# Patient Record
Sex: FEMALE | Race: WHITE | NOT HISPANIC OR LATINO | Employment: FULL TIME | URBAN - METROPOLITAN AREA
[De-identification: names, ages, dates, MRNs, and addresses within clinical notes are randomized per-mention and may not be internally consistent; named-entity substitution may affect disease eponyms.]

---

## 2020-12-14 ENCOUNTER — TRANSFERRED RECORDS (OUTPATIENT)
Dept: HEALTH INFORMATION MANAGEMENT | Facility: CLINIC | Age: 30
End: 2020-12-14

## 2020-12-16 ENCOUNTER — TRANSFERRED RECORDS (OUTPATIENT)
Dept: HEALTH INFORMATION MANAGEMENT | Facility: CLINIC | Age: 30
End: 2020-12-16

## 2020-12-18 ENCOUNTER — TRANSFERRED RECORDS (OUTPATIENT)
Dept: HEALTH INFORMATION MANAGEMENT | Facility: CLINIC | Age: 30
End: 2020-12-18

## 2020-12-22 ENCOUNTER — TRANSFERRED RECORDS (OUTPATIENT)
Dept: HEALTH INFORMATION MANAGEMENT | Facility: CLINIC | Age: 30
End: 2020-12-22

## 2021-01-08 ENCOUNTER — TRANSFERRED RECORDS (OUTPATIENT)
Dept: HEALTH INFORMATION MANAGEMENT | Facility: CLINIC | Age: 31
End: 2021-01-08

## 2021-01-21 ENCOUNTER — TRANSFERRED RECORDS (OUTPATIENT)
Dept: HEALTH INFORMATION MANAGEMENT | Facility: CLINIC | Age: 31
End: 2021-01-21

## 2021-01-21 ENCOUNTER — TELEPHONE (OUTPATIENT)
Dept: OPHTHALMOLOGY | Facility: CLINIC | Age: 31
End: 2021-01-21

## 2021-01-21 NOTE — TELEPHONE ENCOUNTER
Left message with direct number on home number (only number in system) at [phone]    Mick Griffith RN 4:47 PM 01/22/21    ---      [phone]     Left message with Direct number at [phone]    Tentatively scheduled February 11th at 1230 at B location    Mick Griffith RN 8:53 AM 01/22/21         Health Call Center    Phone Message    May a detailed message be left on voicemail: yes     Reason for Call: Appointment Intake    Referring Provider Name: PCP  Dr Mars Blackmon  Diagnosis and/or Symptoms: Pseudo Tumor     Action Taken: Message routed to:  [facility]    Travel Screening: Not Applicable

## 2021-01-25 ENCOUNTER — TELEPHONE (OUTPATIENT)
Dept: OPHTHALMOLOGY | Facility: CLINIC | Age: 31
End: 2021-01-25

## 2021-01-25 NOTE — TELEPHONE ENCOUNTER
I spoke to the patient and verified her upcoming appointment with Dr. Vega.   I reviewed appointment time and location

## 2021-02-11 ENCOUNTER — OFFICE VISIT (OUTPATIENT)
Dept: OPHTHALMOLOGY | Facility: CLINIC | Age: 31
End: 2021-02-11
Attending: OPHTHALMOLOGY
Payer: COMMERCIAL

## 2021-02-11 DIAGNOSIS — H47.10 PAPILLEDEMA: Primary | ICD-10-CM

## 2021-02-11 DIAGNOSIS — H53.40 VISUAL FIELD DEFECT: Primary | ICD-10-CM

## 2021-02-11 DIAGNOSIS — H53.40 VISUAL FIELD DEFECT: ICD-10-CM

## 2021-02-11 PROCEDURE — 92083 EXTENDED VISUAL FIELD XM: CPT | Performed by: OPHTHALMOLOGY

## 2021-02-11 PROCEDURE — G0463 HOSPITAL OUTPT CLINIC VISIT: HCPCS | Mod: 25

## 2021-02-11 PROCEDURE — 99244 OFF/OP CNSLTJ NEW/EST MOD 40: CPT | Mod: GC | Performed by: OPHTHALMOLOGY

## 2021-02-11 PROCEDURE — 92133 CPTRZD OPH DX IMG PST SGM ON: CPT | Performed by: OPHTHALMOLOGY

## 2021-02-11 RX ORDER — ACETAZOLAMIDE 250 MG/1
1000 TABLET ORAL 2 TIMES DAILY
Qty: 240 TABLET | Refills: 11 | Status: SHIPPED | OUTPATIENT
Start: 2021-02-11 | End: 2022-02-17

## 2021-02-11 RX ORDER — ACETAZOLAMIDE 250 MG/1
1000 TABLET ORAL 2 TIMES DAILY
Qty: 240 TABLET | Refills: 11
Start: 2021-02-11 | End: 2021-02-11

## 2021-02-11 RX ORDER — BUPROPION HYDROCHLORIDE 150 MG/1
TABLET ORAL
COMMUNITY
Start: 2020-07-20

## 2021-02-11 RX ORDER — FLUOXETINE 40 MG/1
CAPSULE ORAL
COMMUNITY
Start: 2020-04-13

## 2021-02-11 ASSESSMENT — TONOMETRY
OS_IOP_MMHG: 18
OD_IOP_MMHG: 16
IOP_METHOD: ICARE

## 2021-02-11 ASSESSMENT — EXTERNAL EXAM - LEFT EYE: OS_EXAM: NORMAL

## 2021-02-11 ASSESSMENT — VISUAL ACUITY
OD_CC+: -2/+2
METHOD: SNELLEN - LINEAR
CORRECTION_TYPE: CONTACTS
OS_CC: 20/20
OD_CC: 20/25

## 2021-02-11 ASSESSMENT — SLIT LAMP EXAM - LIDS: COMMENTS: NORMAL

## 2021-02-11 ASSESSMENT — CONF VISUAL FIELD
METHOD: COUNTING FINGERS
OS_NORMAL: 1
OD_NORMAL: 1

## 2021-02-11 ASSESSMENT — EXTERNAL EXAM - RIGHT EYE: OD_EXAM: NORMAL

## 2021-02-11 NOTE — LETTER
2021         RE:  :  MRN: Radha Biswas  1990  [id number]     Dear Dr. Blackmon,    Thank you for asking me to see your very pleasant patient, Radha Biswas, in neuro-ophthalmic consultation.  I would like to thank you for sending your records and I have summarized them in the history of present illness. My assessment and plan are below.  For further details, please see my attached clinic note.      Assessment & Plan     Radha Biswas is a 30 year old female with the following diagnoses:   1. Visual field defect         Patient was sent for consultation by Dr. Mars Blackmon for bilateral disc edema    HPI: Radha Biswas is a 30 year old female who presents for evaluation of bilateral disc edema. The patient first presented to [facility] for routine eye exam to update contact lenses around . She was evaluated by Dr. Forrest (OD) who incidentally noted bilateral disc edema. OCT rNFL showed diffuse thickening of both ONHs, (314 OD, 212 OS); baseline 24-2 VF showed nonspecific deficits in the right eye and inferior arcuate in the left eye (both reliable). She was evaluted by Dr. Blackmon on 20 referred her for MRI/MRV and started her on Diamox 250 mg PO QID and then increased to 500 mg TID. MRI was read by radiologist as showing possible optic nerve sheath enhancement in both eyes but exam was limited by motion artifact; no secondary imaging evidence for intracranial hypertension. MRV was without evidence of dural venous thrombosis (no comment on stenosis). She then underwent LP on 20 which revealed opening pressure 25 cm H2O while lying prone. She started Diamox after obtaining the lumbar puncture. She had positional headache after lumbar puncture and required blood patch x2. Aside from this, her typical headache improved after the lumbar puncture.     The patient reports years of headaches in her temples, forehead, and radiating into her neck. In the last 2 years,  "this headache has progressively worsened. She feels like her headache is almost always there but will fluctuate throughout the day. Ibuprofen will sometime improve her symptoms. No nausea, vomiting, photophobia. Last spring, she noticed that she would have episodes of transient visual obscuration after standing from sitting that would last 10 seconds. The last episode of TVOs was late December 2020. She denies any pulsatile tinnitus. She last took Diamox 1 week ago (ran out of prescription).     She had repeat MRI obtained on 1/8/21 which was read as normal. At her last appt with Dr. Blackmon on 1/21/21, repeat VF 24-2 showed possible enlargement of the blind spot. She was then referred to [facility] for neuro-ophthalmology evaluation. She is 5 ft 8 in.  She is about 89.         POH: Myopia  PMH: shingles x4 (twice in recent years) along ribs; hypertension in pregnancy; grand mal seizure at age 2yo.   SHx: Never smoker. No alcohol use. No illicit drug use.   FHx: negative for autoimmune disease    Laboratory:   BIJAL, anti-CCP, and Borrelia burgdorferi AB negative.   CSF: \"normal fluid\" per notes      Visual acuity is 20/25 right eye and 20/20 left eye. Intraocular pressure was 16/18. There was no afferent pupillary defect. Color plates 11/11 in both eyes. Optical coherence tomography of the retinal nerve fiber layer shows diffuse thickening in both eyes. Octopus visual field shows generalized depression in the right eye and nonspecific deficits in left eye. Slit lamp exam is normal. Dilated fundus exam shows grade 3 optic disc edema in both eyes.     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  She continues to have pretty significant edema despite being on Diamox 1500 mg daily.  I will have her increase her Diamox to 2000 mg daily.   Goal of weight loss of 12 pounds was discussed.  We discussed risk of pregnancy while on Diamox since she is interested in becoming pregnant " it within the next year.  Follow up in 6 weeks or sooner as needed for worsening symptoms.        Again, thank you for allowing me to participate in the care of your patient.      Sincerely,    Jorge Vega MD  Professor  Ophthalmology Residency   Director of Neuro-Ophthalmology  [employer] [facility] [employer] Endowed Chair  Departments of Ophthalmology, Neurology, and Neurosurgery  [facility] [address]  [address] [place] [address]  [id number]  T - [phone]  F - [phone]  [email or web address] [email or web address]      CC: [facility]  [address] [facility] [address]  [facility] [address]  Via Fax: [phone]    DX = Idiopathic Intracranial Hypertension (IIH)

## 2021-02-11 NOTE — NURSING NOTE
Chief Complaint(s) and History of Present Illness(es)     Papilledema Evaluation     Laterality: both eyes    Onset: unknown    Course: stable    Associated symptoms: Negative for eye pain, double vision, abnormal color vision and headache              Comments     Eval for Papilledema. Was taking Diamox TID, unsure of the dose (since 12/25/20), ran out last weekend. Dr. Blackmon increased the dose at one point.   Reports mild HAs now. No changes in vision noted. Dr. Blackmon noted abnormality on exam for contact refill. Isa has never had any symptoms.   MRI: 2x, initial image showed something on the optic nerve, 2nd image was normal. +LP    Inf: pt

## 2021-02-11 NOTE — Clinical Note
2/11/2021       RE: Radha Biswas  [address] [facility] [address]     Dear Colleague,    Thank you for referring your patient, Radha Biswas, to the [facility] at [facility]. Please see a copy of my visit note below.         Assessment & Plan     Radha Biswas is a 30 year old female with the following diagnoses:   1. Visual field defect         Patient was sent for consultation by Dr. Mars Blackmon for bilateral disc edema    HPI: Radha Biswas is a 30 year old female who presents for evaluation of bilateral disc edema. The patient first presented to [facility] for routine eye exam to update contact lenses around 12/14. She was evaluated by Dr. Forrest (OD) who incidentally noted bilateral disc edema. OCT rNFL showed diffuse thickening of both ONHs, (314 OD, 212 OS); baseline 24-2 VF showed nonspecific deficits in the right eye and inferior arcuate in the left eye (both reliable). She was evaluted by Dr. Blackmon on 12/17/20 referred her for MRI/MRV and started her on Diamox 250 mg PO QID and then increased to 500 mg TID. MRI was read by radiologist as showing possible optic nerve sheath enhancement in both eyes but exam was limited by motion artifact; no secondary imaging evidence for intracranial hypertension. MRV was without evidence of dural venous thrombosis (no comment on stenosis). She then underwent LP on 12/22/20 which revealed opening pressure 25 cm H2O while lying prone. She started Diamox after obtaining the lumbar puncture. She had positional headache after lumbar puncture and required blood patch x2. Aside from this, her typical headache improved after the lumbar puncture.     The patient reports years of headaches in her temples, forehead, and radiating into her neck. In the last 2 years, this headache has progressively worsened. She feels like her headache is almost always there but will fluctuate throughout  "the day. Ibuprofen will sometime improve her symptoms. No nausea, vomiting, photophobia. Last spring, she noticed that she would have episodes of transient visual obscuration after standing from sitting that would last 10 seconds. The last episode of TVOs was late December 2020. She denies any pulsatile tinnitus. She last took Diamox 1 week ago (ran out of prescription).     She had repeat MRI obtained on 1/8/21 which was read as normal. At her last appt with Dr. Blackmon on 1/21/21, repeat VF 24-2 showed possible enlargement of the blind spot. She was then referred to [facility] for neuro-ophthalmology evaluation. She is 5 ft 8 in.  She is about 89.         POH: Myopia  PMH: shingles x4 (twice in recent years) along ribs; hypertension in pregnancy; grand mal seizure at age 1yo.   SHx: Never smoker. No alcohol use. No illicit drug use.   FHx: negative for autoimmune disease    Laboratory:   BIJAL, anti-CCP, and Borrelia burgdorferi AB negative.   CSF: \"normal fluid\" per notes      Visual acuity is 20/25 right eye and 20/20 left eye. Intraocular pressure was 16/18. There was no afferent pupillary defect. Color plates 11/11 in both eyes. Optical coherence tomography of the retinal nerve fiber layer shows diffuse thickening in both eyes. Octopus visual field shows generalized depression in the right eye and nonspecific deficits in left eye. Slit lamp exam is normal. Dilated fundus exam shows grade 3 optic disc edema in both eyes.     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  She continues to have pretty significant edema despite being on Diamox 1500 mg daily.  I will have her increase her Diamox to 2000 mg daily.   Goal of weight loss of 12 pounds was discussed.  We discussed risk of pregnancy while on Diamox since she is interested in becoming pregnant it within the next year.  Follow up in 6 weeks or sooner as needed for worsening symptoms.              Attending " Physician Attestation:  Complete documentation of historical and exam elements from today's encounter can be found in the full encounter summary report (not reduplicated in this progress note).  I personally obtained the chief complaint(s) and history of present illness.  I confirmed and edited as necessary the review of systems, past medical/surgical history, family history, social history, and examination findings as documented by others; and I examined the patient myself.  I personally reviewed the relevant tests, images, and reports as documented above.  I formulated and edited as necessary the assessment and plan and discussed the findings and management plan with the patient and family. I personally reviewed the ophthalmic test(s) associated with this encounter, agree with the interpretation(s) as documented by the resident/fellow, and have edited the corresponding report(s) as necessary.  - Jorge Cope MD  Ophthalmology PGY-3  [facility]         Again, thank you for allowing me to participate in the care of your patient.      Sincerely,    Jorge Vega MD

## 2021-02-11 NOTE — PROGRESS NOTES
Assessment & Plan     Radha Biswas is a 30 year old female with the following diagnoses:   1. Visual field defect         Patient was sent for consultation by Dr. Mars Blackmon for bilateral disc edema    HPI: Radha Biswas is a 30 year old female who presents for evaluation of bilateral disc edema. The patient first presented to [facility] for routine eye exam to update contact lenses around 12/14. She was evaluated by Dr. Forrest (OD) who incidentally noted bilateral disc edema. OCT rNFL showed diffuse thickening of both ONHs, (314 OD, 212 OS); baseline 24-2 VF showed nonspecific deficits in the right eye and inferior arcuate in the left eye (both reliable). She was evaluted by Dr. Blackmon on 12/17/20 referred her for MRI/MRV and started her on Diamox 250 mg PO QID and then increased to 500 mg TID. MRI was read by radiologist as showing possible optic nerve sheath enhancement in both eyes but exam was limited by motion artifact; no secondary imaging evidence for intracranial hypertension. MRV was without evidence of dural venous thrombosis (no comment on stenosis). She then underwent LP on 12/22/20 which revealed opening pressure 25 cm H2O while lying prone. She started Diamox after obtaining the lumbar puncture. She had positional headache after lumbar puncture and required blood patch x2. Aside from this, her typical headache improved after the lumbar puncture.     The patient reports years of headaches in her temples, forehead, and radiating into her neck. In the last 2 years, this headache has progressively worsened. She feels like her headache is almost always there but will fluctuate throughout the day. Ibuprofen will sometime improve her symptoms. No nausea, vomiting, photophobia. Last spring, she noticed that she would have episodes of transient visual obscuration after standing from sitting that would last 10 seconds. The last episode of TVOs was late December 2020. She  "denies any pulsatile tinnitus. She last took Diamox 1 week ago (ran out of prescription).     She had repeat MRI obtained on 1/8/21 which was read as normal. At her last appt with Dr. Blackmon on 1/21/21, repeat VF 24-2 showed possible enlargement of the blind spot. She was then referred to [facility] for neuro-ophthalmology evaluation. She is 5 ft 8 in.  She is about 89.         POH: Myopia  PMH: shingles x4 (twice in recent years) along ribs; hypertension in pregnancy; grand mal seizure at age 1yo.   SHx: Never smoker. No alcohol use. No illicit drug use.   FHx: negative for autoimmune disease    Laboratory:   BIJAL, anti-CCP, and Borrelia burgdorferi AB negative.   CSF: \"normal fluid\" per notes      Visual acuity is 20/25 right eye and 20/20 left eye. Intraocular pressure was 16/18. There was no afferent pupillary defect. Color plates 11/11 in both eyes. Optical coherence tomography of the retinal nerve fiber layer shows diffuse thickening in both eyes. Octopus visual field shows generalized depression in the right eye and nonspecific deficits in left eye. Slit lamp exam is normal. Dilated fundus exam shows grade 3 optic disc edema in both eyes.     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  She continues to have pretty significant edema despite being on Diamox 1500 mg daily.  I will have her increase her Diamox to 2000 mg daily.   Goal of weight loss of 12 pounds was discussed.  We discussed risk of pregnancy while on Diamox since she is interested in becoming pregnant it within the next year.  Follow up in 6 weeks or sooner as needed for worsening symptoms.              Attending Physician Attestation:  Complete documentation of historical and exam elements from today's encounter can be found in the full encounter summary report (not reduplicated in this progress note).  I personally obtained the chief complaint(s) and history of present illness.  I confirmed and " edited as necessary the review of systems, past medical/surgical history, family history, social history, and examination findings as documented by others; and I examined the patient myself.  I personally reviewed the relevant tests, images, and reports as documented above.  I formulated and edited as necessary the assessment and plan and discussed the findings and management plan with the patient and family. I personally reviewed the ophthalmic test(s) associated with this encounter, agree with the interpretation(s) as documented by the resident/fellow, and have edited the corresponding report(s) as necessary.  - Jorge Cope MD  Ophthalmology PGY-3  [facility]

## 2021-03-25 ENCOUNTER — OFFICE VISIT (OUTPATIENT)
Dept: OPHTHALMOLOGY | Facility: CLINIC | Age: 31
End: 2021-03-25
Attending: OPHTHALMOLOGY
Payer: COMMERCIAL

## 2021-03-25 DIAGNOSIS — H53.40 VISUAL FIELD DEFECT: Primary | ICD-10-CM

## 2021-03-25 DIAGNOSIS — H47.10 PAPILLEDEMA: ICD-10-CM

## 2021-03-25 PROCEDURE — 92133 CPTRZD OPH DX IMG PST SGM ON: CPT | Performed by: OPHTHALMOLOGY

## 2021-03-25 PROCEDURE — 92083 EXTENDED VISUAL FIELD XM: CPT | Performed by: OPHTHALMOLOGY

## 2021-03-25 PROCEDURE — 99213 OFFICE O/P EST LOW 20 MIN: CPT | Mod: GC | Performed by: OPHTHALMOLOGY

## 2021-03-25 PROCEDURE — G0463 HOSPITAL OUTPT CLINIC VISIT: HCPCS | Mod: 25

## 2021-03-25 ASSESSMENT — EXTERNAL EXAM - RIGHT EYE: OD_EXAM: NORMAL

## 2021-03-25 ASSESSMENT — VISUAL ACUITY
OD_CC: 20/20
OS_CC: 20/20
METHOD: SNELLEN - LINEAR
OS_CC+: -1
OD_CC+: -2

## 2021-03-25 ASSESSMENT — REFRACTION_WEARINGRX
OD_CYLINDER: +0.75
OS_SPHERE: -5.50
OD_AXIS: 110
OD_SPHERE: -7.25
OS_AXIS: 075
OS_CYLINDER: +1.50

## 2021-03-25 ASSESSMENT — CONF VISUAL FIELD
OD_NORMAL: 1
METHOD: COUNTING FINGERS
OS_NORMAL: 1

## 2021-03-25 ASSESSMENT — SLIT LAMP EXAM - LIDS: COMMENTS: NORMAL

## 2021-03-25 ASSESSMENT — TONOMETRY
IOP_METHOD: ICARE S/S
OD_IOP_MMHG: 13
OS_IOP_MMHG: 12

## 2021-03-25 ASSESSMENT — EXTERNAL EXAM - LEFT EYE: OS_EXAM: NORMAL

## 2021-03-25 NOTE — PROGRESS NOTES
"       Assessment & Plan     Radha Biswas is a 30 year old female with the following diagnoses:   1. Visual field defect         30 year old woman presented for follow up on papilledema from [facility]. She was last seen in  2/11/2021 back then he has significant edema in both eyes in 1500mg of diamox so we increased the dose to 2000mg. She lost 7 lbs since we last saw her. She is having tingling in her extremities but it is well tolerated.    POH: Myopia  PMH: shingles x4 (twice in recent years) along ribs; hypertension in pregnancy; grand mal seizure at age 1yo.   SHx: Never smoker. No alcohol use. No illicit drug use.   FHx: negative for autoimmune disease  Meds: fluoxitine, nortriptyline, alprazolam, bupropion     Laboratory:   BIJAL, anti-CCP, and Borrelia burgdorferi AB negative.   CSF: \"normal fluid\" per notes    Visual acuity is 20/20 in both eyes. Intraocular pressure was 16/18. There was no afferent pupillary defect. Color plates 11/11 in both eyes. Optical coherence tomography of the retinal nerve fiber layer shows diffuse thickening in both eyes that is better compared to last visit. Octopus visual field shows generalized depression in the right eye and nonspecific deficits in left eye. Slit lamp exam is normal. Dilated fundus exam shows grade 3 optic disc edema in both eyes.     My impression is that her papilledema is better today. Keep same dose of diamox and follow up in 2-3 months.             Attending Physician Attestation:  Complete documentation of historical and exam elements from today's encounter can be found in the full encounter summary report (not reduplicated in this progress note).  I personally obtained the chief complaint(s) and history of present illness.  I confirmed and edited as necessary the review of systems, past medical/surgical history, family history, social history, and examination findings as documented by others; and I examined the patient myself.  I personally reviewed the " relevant tests, images, and reports as documented above.  I formulated and edited as necessary the assessment and plan and discussed the findings and management plan with the patient and family. I personally reviewed the ophthalmic test(s) associated with this encounter, agree with the interpretation(s) as documented by the resident/fellow, and have edited the corresponding report(s) as necessary.  - Jorge Hackett MD  Neuro-ophthalmology fellow  [facility]

## 2021-07-15 ENCOUNTER — OFFICE VISIT (OUTPATIENT)
Dept: OPHTHALMOLOGY | Facility: CLINIC | Age: 31
End: 2021-07-15
Attending: OPHTHALMOLOGY
Payer: COMMERCIAL

## 2021-07-15 DIAGNOSIS — G93.2 IDIOPATHIC INTRACRANIAL HYPERTENSION: Primary | ICD-10-CM

## 2021-07-15 DIAGNOSIS — H47.10 PAPILLEDEMA: ICD-10-CM

## 2021-07-15 DIAGNOSIS — H47.10 PAPILLEDEMA: Primary | ICD-10-CM

## 2021-07-15 PROCEDURE — 92014 COMPRE OPH EXAM EST PT 1/>: CPT | Mod: GC | Performed by: OPHTHALMOLOGY

## 2021-07-15 PROCEDURE — G0463 HOSPITAL OUTPT CLINIC VISIT: HCPCS

## 2021-07-15 PROCEDURE — 92133 CPTRZD OPH DX IMG PST SGM ON: CPT | Performed by: OPHTHALMOLOGY

## 2021-07-15 ASSESSMENT — REFRACTION_WEARINGRX
OD_CYLINDER: +0.75
OD_AXIS: 110
OS_CYLINDER: +1.50
OS_SPHERE: -5.50
OS_AXIS: 075
OD_SPHERE: -7.25

## 2021-07-15 ASSESSMENT — EXTERNAL EXAM - RIGHT EYE: OD_EXAM: NORMAL

## 2021-07-15 ASSESSMENT — TONOMETRY
IOP_METHOD: ICARE
OD_IOP_MMHG: 14
OS_IOP_MMHG: 13

## 2021-07-15 ASSESSMENT — CONF VISUAL FIELD
OS_NORMAL: 1
OD_NORMAL: 1

## 2021-07-15 ASSESSMENT — VISUAL ACUITY
OS_CC: 20/20
METHOD: SNELLEN - LINEAR
OD_CC: 20/20

## 2021-07-15 ASSESSMENT — SLIT LAMP EXAM - LIDS: COMMENTS: NORMAL

## 2021-07-15 ASSESSMENT — EXTERNAL EXAM - LEFT EYE: OS_EXAM: NORMAL

## 2021-07-15 NOTE — PROGRESS NOTES
"       Assessment & Plan     Radha Biswas is a 30 year old female with the following diagnoses:   1. Papilledema         30 year old woman presented for follow up on papilledema from [facility]. She was last seen in 3/25/21 at which time degree of papilledema appeared improved on exam and OCT, and VF was stable . She was thus maintained on the same Diamox dose of 1000 mg BID.     Today, she reports ***    POH: Myopia  PMH: shingles x4 (twice in recent years) along ribs; hypertension in pregnancy; grand mal seizure at age 1yo.   SHx: Never smoker. No alcohol use. No illicit drug use.   FHx: negative for autoimmune disease  Meds: fluoxitine, nortriptyline, alprazolam, bupropion     Laboratory:   BIJAL, anti-CCP, and Borrelia burgdorferi AB negative.   CSF: \"normal fluid\" per notes    Exam:             Attending Physician Attestation:  Complete documentation of historical and exam elements from today's encounter can be found in the full encounter summary report (not reduplicated in this progress note).  I personally obtained the chief complaint(s) and history of present illness.  I confirmed and edited as necessary the review of systems, past medical/surgical history, family history, social history, and examination findings as documented by others; and I examined the patient myself.  I personally reviewed the relevant tests, images, and reports as documented above.  I formulated and edited as necessary the assessment and plan and discussed the findings and management plan with the patient and family. I personally reviewed the ophthalmic test(s) associated with this encounter, agree with the interpretation(s) as documented by the resident/fellow, and have edited the corresponding report(s) as necessary.  - Jorge Hackett MD  Neuro-ophthalmology fellow  [facility]    "

## 2021-07-15 NOTE — NURSING NOTE
Chief Complaints and History of Present Illnesses   Patient presents with     Blurred Vision Follow-Up     Chief Complaint(s) and History of Present Illness(es)     Blurred Vision Follow-Up               Comments     Radha Biswas is a 30 year old female with the following diagnoses:   1. Visual field defect   Patient feels improvement in headaches, even when she is non-compliant with medication.   Patient taking 4- 250 mg diamox twice daily. Tolerating well.   No vision changes.   Marlaor [employer] 10:00 AM July 15, 2021

## 2021-07-15 NOTE — PROGRESS NOTES
"       Assessment & Plan     Radha Biswas is a 30 year old female with the following diagnoses:   1. Idiopathic intracranial hypertension    2. Papilledema         Patient was last seen on 3/25/21 for follow up of idiopathic intracranial hypertension (IIH).  Last visit her papilledema was improved.  I had her continue on Diamox 2000 mg daily.      Since her last visit, she notes improvement in headaches, which are largely resolved at this point. She describes mild, intermittent ringing in her ears, denies \"whooshing\" or pulsatile tinnitus. She denies blurred vision, double vision, eye pain. She has been taking diamox as prescribed, notes improved associated tingling and rare nausea. She has lost 15 lbs since initial visit.      POH: Myopia  PMH: shingles x4 (twice in recent years) along ribs; hypertension in pregnancy; grand mal seizure at age 2yo.   SHx: Never smoker. No alcohol use. No illicit drug use.   FHx: negative for autoimmune disease  Meds: fluoxitine, nortriptyline, alprazolam, bupropion     Laboratory:   BIJAL, anti-CCP, and Borrelia burgdorferi AB negative.   CSF: \"normal fluid\" per notes    Visual acuity is 20/20 in both eyes. Intraocular pressure was 14/13. There was no afferent pupillary defect. Color plates 11/11 in both eyes. Optical coherence tomography of the retinal nerve fiber layer shows diffuse thickening in both eyes that is dramatically improved compared to last visit. Slit lamp exam is normal. Dilated fundus exam shows grade 2 optic disc edema in both eyes.     My impression is that her papilledema is better today. Patient states that she is tolerating diamox without problems, is interested in resolution of optic disc edema and discontinuation of diamox in a timely fashion, especially as she is considering having another child. Will have her continue current dose, continue lifestyle management for weight loss, and follow up in 3 months.        Attending Physician Attestation:  Complete " documentation of historical and exam elements from today's encounter can be found in the full encounter summary report (not reduplicated in this progress note).  I personally obtained the chief complaint(s) and history of present illness.  I confirmed and edited as necessary the review of systems, past medical/surgical history, family history, social history, and examination findings as documented by others; and I examined the patient myself.  I personally reviewed the relevant tests, images, and reports as documented above.  I formulated and edited as necessary the assessment and plan and discussed the findings and management plan with the patient and family. I personally reviewed the ophthalmic test(s) associated with this encounter, agree with the interpretation(s) as documented by the resident/fellow, and have edited the corresponding report(s) as necessary.  - Jorge Matos MD  Ophthalmology Resident, PGY-3

## 2021-10-19 ENCOUNTER — OFFICE VISIT (OUTPATIENT)
Dept: OPHTHALMOLOGY | Facility: CLINIC | Age: 31
End: 2021-10-19
Attending: OPHTHALMOLOGY
Payer: COMMERCIAL

## 2021-10-19 DIAGNOSIS — H47.10 OPTIC DISC EDEMA: ICD-10-CM

## 2021-10-19 DIAGNOSIS — G93.2 IDIOPATHIC INTRACRANIAL HYPERTENSION: Primary | ICD-10-CM

## 2021-10-19 PROCEDURE — 92014 COMPRE OPH EXAM EST PT 1/>: CPT | Mod: GC | Performed by: OPHTHALMOLOGY

## 2021-10-19 PROCEDURE — 92133 CPTRZD OPH DX IMG PST SGM ON: CPT | Performed by: OPHTHALMOLOGY

## 2021-10-19 PROCEDURE — G0463 HOSPITAL OUTPT CLINIC VISIT: HCPCS

## 2021-10-19 ASSESSMENT — VISUAL ACUITY
OD_CC: 20/25
OS_CC: 20/25
CORRECTION_TYPE: CONTACTS
OD_CC+: +2
METHOD: SNELLEN - LINEAR

## 2021-10-19 ASSESSMENT — CONF VISUAL FIELD
OS_NORMAL: 1
OD_NORMAL: 1

## 2021-10-19 ASSESSMENT — TONOMETRY
OD_IOP_MMHG: 14
OS_IOP_MMHG: 16
IOP_METHOD: ICARE

## 2021-10-19 ASSESSMENT — SLIT LAMP EXAM - LIDS: COMMENTS: NORMAL

## 2021-10-19 ASSESSMENT — EXTERNAL EXAM - RIGHT EYE: OD_EXAM: NORMAL

## 2021-10-19 ASSESSMENT — EXTERNAL EXAM - LEFT EYE: OS_EXAM: NORMAL

## 2021-10-19 NOTE — PROGRESS NOTES
"       Assessment & Plan     Radha Biswas is a 30 year old female with the following diagnoses:   1. Visual field defect         Patient was last seen on 7/15/21 for follow up of idiopathic intracranial hypertension (IIH).  Last visit her papilledema was improved.  I had her continue   on Diamox 1000 mg BID.     She reports headaches for the last few weeks. Does not wake up with them and they do not worsen with laying flat. These have been managed with ibuprofen.     Denies double vision, pulsatile tinnitus, TVOs. ROS negative for use of oral antibiotics for acne of the tetracycline class, retinoid acne creams, oral vitamin A supplements. She thinks her weight is the same or maybe a few lbs lighter. She was -15 lbs at last visit.     POH: Myopia  PMH: shingles x4 (twice in recent years) along ribs; hypertension in pregnancy; grand mal seizure at age 1yo.   SHx: Never smoker. No alcohol use. No illicit drug use.   FHx: negative for autoimmune disease  Meds: fluoxitine, nortriptyline, alprazolam, bupropion     Laboratory:   BIJAL, anti-CCP, and Borrelia burgdorferi AB negative.   CSF: \"normal fluid\" per notes    Visual acuity with correction was 20/25+2 in the right eye and 20/25 in the left eye. IOP was 14 in the right eye and 16 in the left eye. Visual fields were full in both eyes. Ocular motility was full in all gaze directions. Color plates were 11/11 in the right eye and 11/11 in the left eye.  Pupils were equal, round and reactive to light with no RAPD.     Strabismus exam: Full, ortho     Anterior segment exam: normal  Dilated fundus exam: grade one papilledema right eye, trace left eye     Tests ordered and interpreted today:  OCT rNFL demonstrated a mean NFL thickness of 110 (from 126) in the right eye and 115 (from 129) in the left eye. Thickness profile demonstrated borderline thickening in both eyes  VF: No VF performed today.     My impression is that her papilledema is better today though is still " present. We will decrease her to 750 mg BID. Follow-up in 3 months.         Attending Physician Attestation:  Complete documentation of historical and exam elements from today's encounter can be found in the full encounter summary report (not reduplicated in this progress note).  I personally obtained the chief complaint(s) and history of present illness.  I confirmed and edited as necessary the review of systems, past medical/surgical history, family history, social history, and examination findings as documented by others; and I examined the patient myself.  I personally reviewed the relevant tests, images, and reports as documented above.  I formulated and edited as necessary the assessment and plan and discussed the findings and management plan with the patient and family. I personally reviewed the ophthalmic test(s) associated with this encounter, agree with the interpretation(s) as documented by the resident/fellow, and have edited the corresponding report(s) as necessary.  - Jorge Vinson, DO   PGY-5 Neuro-Ophthalmology Fellow

## 2021-10-19 NOTE — NURSING NOTE
Chief Complaints and History of Present Illnesses   Patient presents with     Blurred Vision Follow-Up     Chief Complaint(s) and History of Present Illness(es)     Blurred Vision Follow-Up               Comments     Radha Biswas is a 31 year old female with the following diagnoses:   1. Idiopathic intracranial hypertension   2. Papilledema     Unsure if there has been a significant change in vision.   Increase in headaches. Started again in the last couple of weeks.   No pulsatile tinnitus.   Currently taking 4-250 mg diamox bid. Tolerating well.     Johana HERNANDES 10:30 AM October 19, 2021

## 2022-02-18 DIAGNOSIS — H53.40 VISUAL FIELD DEFECT: ICD-10-CM

## 2022-02-18 DIAGNOSIS — H47.10 PAPILLEDEMA: ICD-10-CM

## 2022-02-18 RX ORDER — ACETAZOLAMIDE 250 MG/1
TABLET ORAL
Qty: 240 TABLET | Refills: 11 | OUTPATIENT
Start: 2022-02-18

## 2022-02-18 NOTE — TELEPHONE ENCOUNTER
acetaZOLAMIDE (DIAMOX) 250 MG tablet 240 tablet 3 2/17/2022     Duplicate, sent 2/17/22 to [id number]

## 2022-02-19 DIAGNOSIS — H53.40 VISUAL FIELD DEFECT: ICD-10-CM

## 2022-02-19 DIAGNOSIS — H47.10 PAPILLEDEMA: ICD-10-CM

## 2022-02-19 RX ORDER — ACETAZOLAMIDE 250 MG/1
TABLET ORAL
Qty: 240 TABLET | Refills: 11 | OUTPATIENT
Start: 2022-02-19

## 2022-02-20 DIAGNOSIS — H47.10 PAPILLEDEMA: ICD-10-CM

## 2022-02-20 DIAGNOSIS — H53.40 VISUAL FIELD DEFECT: ICD-10-CM

## 2022-02-20 RX ORDER — ACETAZOLAMIDE 250 MG/1
TABLET ORAL
Qty: 240 TABLET | Refills: 11 | OUTPATIENT
Start: 2022-02-20

## 2022-03-01 ENCOUNTER — OFFICE VISIT (OUTPATIENT)
Dept: OPHTHALMOLOGY | Facility: CLINIC | Age: 32
End: 2022-03-01
Attending: OPHTHALMOLOGY
Payer: COMMERCIAL

## 2022-03-01 DIAGNOSIS — H53.40 VISUAL FIELD DEFECT: ICD-10-CM

## 2022-03-01 DIAGNOSIS — G93.2 IDIOPATHIC INTRACRANIAL HYPERTENSION: Primary | ICD-10-CM

## 2022-03-01 DIAGNOSIS — H47.10 PAPILLEDEMA: Primary | ICD-10-CM

## 2022-03-01 DIAGNOSIS — G93.2 IDIOPATHIC INTRACRANIAL HYPERTENSION: ICD-10-CM

## 2022-03-01 PROCEDURE — 92133 CPTRZD OPH DX IMG PST SGM ON: CPT | Performed by: OPHTHALMOLOGY

## 2022-03-01 PROCEDURE — 92014 COMPRE OPH EXAM EST PT 1/>: CPT | Mod: GC | Performed by: OPHTHALMOLOGY

## 2022-03-01 PROCEDURE — G0463 HOSPITAL OUTPT CLINIC VISIT: HCPCS | Performed by: TECHNICIAN/TECHNOLOGIST

## 2022-03-01 ASSESSMENT — SLIT LAMP EXAM - LIDS: COMMENTS: NORMAL

## 2022-03-01 ASSESSMENT — EXTERNAL EXAM - LEFT EYE: OS_EXAM: NORMAL

## 2022-03-01 ASSESSMENT — CONF VISUAL FIELD
OS_NORMAL: 1
METHOD: COUNTING FINGERS
OD_NORMAL: 1

## 2022-03-01 ASSESSMENT — TONOMETRY
IOP_METHOD: ICARE
OS_IOP_MMHG: 11
OD_IOP_MMHG: 12

## 2022-03-01 ASSESSMENT — VISUAL ACUITY
OS_CC: 20/20
OS_CC+: -1
OD_CC+: -2+1
CORRECTION_TYPE: GLASSES
METHOD: SNELLEN - LINEAR
OD_CC: 20/25

## 2022-03-01 ASSESSMENT — CUP TO DISC RATIO
OD_RATIO: 0
OS_RATIO: 0

## 2022-03-01 ASSESSMENT — EXTERNAL EXAM - RIGHT EYE: OD_EXAM: NORMAL

## 2022-03-01 NOTE — PROGRESS NOTES
Assessment & Plan     Radha Biswas is a 31 year old female with the following diagnoses:   1. Papilledema    2. Idiopathic intracranial hypertension    3. Visual field defect       Patient was last seen on 10/19/21 for follow up of idiopathic intracranial hypertension (IIH).  Last visit her optic disc edema was still present but was improved.  I asked her to decrease Diamox to 750 mg twice a day.      Patient is doing well on current dose.  Has some dry mouth from this medication otherwise no other adverse reactions. Vision is stable in both eyes, no headaches, no pulsatile tinnitus, no diplopia.  Reports no weight change since last visit.  Missed last appointment secondary to Covid infection.    On exam visual acuity is stable in both eyes with full color vision.  Appearance of the optic nerves today appear stable with grade 1 on the right and grade 1-2 papilledema on the left, however OCT RNFL today shows an improvement of edema of the right eye from G110 to 97 and on the left eye as well from G115 to 103.    Considering noted improvement, will decrease Diamox to 500 twice daily x 1 month, then 250 daily x 1 month, then stop.  Follow up 3-4 months off medication.  Patient to call if has new symptoms during taper.             Attending Physician Attestation:  Complete documentation of historical and exam elements from today's encounter can be found in the full encounter summary report (not reduplicated in this progress note).  I personally obtained the chief complaint(s) and history of present illness.  I confirmed and edited as necessary the review of systems, past medical/surgical history, family history, social history, and examination findings as documented by others; and I examined the patient myself.  I personally reviewed the relevant tests, images, and reports as documented above.  I formulated and edited as necessary the assessment and plan and discussed the findings and management plan with the  patient and family. I personally reviewed the ophthalmic test(s) associated with this encounter, agree with the interpretation(s) as documented by the resident/fellow, and have edited the corresponding report(s) as necessary.  - Jorge Trujillo MD  PGY-3 Resident Physician  Department of Ophthalmology

## 2022-06-28 DIAGNOSIS — H47.10 PAPILLEDEMA: Primary | ICD-10-CM

## 2024-05-06 ENCOUNTER — TRANSFERRED RECORDS (OUTPATIENT)
Dept: HEALTH INFORMATION MANAGEMENT | Facility: CLINIC | Age: 34
End: 2024-05-06
Payer: COMMERCIAL